# Patient Record
Sex: FEMALE | Race: WHITE | HISPANIC OR LATINO | Employment: FULL TIME | URBAN - METROPOLITAN AREA
[De-identification: names, ages, dates, MRNs, and addresses within clinical notes are randomized per-mention and may not be internally consistent; named-entity substitution may affect disease eponyms.]

---

## 2024-07-22 ENCOUNTER — APPOINTMENT (OUTPATIENT)
Dept: GENERAL RADIOLOGY | Facility: HOSPITAL | Age: 29
End: 2024-07-22
Payer: MEDICAID

## 2024-07-22 ENCOUNTER — HOSPITAL ENCOUNTER (OUTPATIENT)
Facility: HOSPITAL | Age: 29
Discharge: HOME | End: 2024-07-22
Attending: EMERGENCY MEDICINE | Admitting: EMERGENCY MEDICINE
Payer: MEDICAID

## 2024-07-22 VITALS
RESPIRATION RATE: 18 BRPM | OXYGEN SATURATION: 96 % | HEART RATE: 83 BPM | TEMPERATURE: 98.2 F | SYSTOLIC BLOOD PRESSURE: 130 MMHG | WEIGHT: 209 LBS | DIASTOLIC BLOOD PRESSURE: 100 MMHG | BODY MASS INDEX: 37.03 KG/M2 | HEIGHT: 63 IN

## 2024-07-22 DIAGNOSIS — J22 LOWER RESPIRATORY INFECTION (E.G., BRONCHITIS, PNEUMONIA, PNEUMONITIS, PULMONITIS): Primary | ICD-10-CM

## 2024-07-22 DIAGNOSIS — R06.2 WHEEZING: ICD-10-CM

## 2024-07-22 LAB
FLUAV SUBTYP SPEC NAA+PROBE: NOT DETECTED
FLUBV RNA ISLT QL NAA+PROBE: NOT DETECTED
SARS-COV-2 RNA RESP QL NAA+PROBE: NOT DETECTED

## 2024-07-22 PROCEDURE — 99203 OFFICE O/P NEW LOW 30 MIN: CPT

## 2024-07-22 PROCEDURE — G0463 HOSPITAL OUTPT CLINIC VISIT: HCPCS

## 2024-07-22 PROCEDURE — 87636 SARSCOV2 & INF A&B AMP PRB: CPT

## 2024-07-22 PROCEDURE — 71046 X-RAY EXAM CHEST 2 VIEWS: CPT

## 2024-07-22 RX ORDER — METHYLPREDNISOLONE 4 MG/1
TABLET ORAL
Qty: 21 TABLET | Refills: 0 | Status: SHIPPED | OUTPATIENT
Start: 2024-07-22

## 2024-07-22 RX ORDER — AZITHROMYCIN 250 MG/1
TABLET, FILM COATED ORAL
Qty: 6 TABLET | Refills: 0 | Status: SHIPPED | OUTPATIENT
Start: 2024-07-22

## 2024-07-22 NOTE — Clinical Note
[facility] [place] [address] [place] [address] [place] [address]  Phone: [phone]    Brooke Winston was seen and treated in our emergency department on 7/22/2024.  She may return to work on 07/23/2024.         Thank you for choosing [facility].    Abdoul Menjivar Jr., APRN

## 2024-07-22 NOTE — FSED PROVIDER NOTE
Subjective   History of Present Illness  29-year-old female reports Friday night having nasal congestion reports that all the congestion is now in her chest reports she has history of childhood asthma reports several children are sick at home.  Reports that her cough is mainly nonproductive but now she is wheezing.        Review of Systems   All other systems reviewed and are negative.      History reviewed. No pertinent past medical history.    No Known Allergies    History reviewed. No pertinent surgical history.    History reviewed. No pertinent family history.    Social History     Socioeconomic History    Marital status:            Objective   Physical Exam  Constitutional:       Appearance: Normal appearance.   HENT:      Head: Normocephalic and atraumatic.      Right Ear: Tympanic membrane and ear canal normal.      Left Ear: Tympanic membrane and ear canal normal.      Nose: Nose normal.   Eyes:      Extraocular Movements: Extraocular movements intact.      Pupils: Pupils are equal, round, and reactive to light.   Cardiovascular:      Pulses: Normal pulses.   Pulmonary:      Effort: Pulmonary effort is normal.      Breath sounds: Wheezing present.   Abdominal:      General: Abdomen is flat.      Palpations: Abdomen is soft.   Musculoskeletal:         General: Normal range of motion.   Skin:     General: Skin is warm.   Neurological:      General: No focal deficit present.      Mental Status: She is alert and oriented to person, place, and time.         Procedures           ED Course  ED Course as of 07/22/24 1806   Mon Jul 22, 2024   1704 COVID and flu are negative [WF]   1705 Chest x-ray  Impression:  No acute cardiopulmonary abnormality.   [WF]      ED Course User Index  [WF] Abdoul Menjivar Jr., APRN                                           Medical Decision Making  Patient lung sounds show evidence of wheezing coarse bronchial lung sounds will discharge home on a Z-Wily with Medrol dose  taper    Problems Addressed:  Lower respiratory infection (e.g., bronchitis, pneumonia, pneumonitis, pulmonitis): complicated acute illness or injury  Wheezing: complicated acute illness or injury    Amount and/or Complexity of Data Reviewed  Radiology: ordered.    Risk  Prescription drug management.        Final diagnoses:   Lower respiratory infection (e.g., bronchitis, pneumonia, pneumonitis, pulmonitis)   Wheezing       ED Disposition  ED Disposition       ED Disposition   Discharge    Condition   Stable    Comment   --               PATIENT CONNECTION - [facility] [address]  [phone]  Schedule an appointment as soon as possible for a visit   Or follow-up with your primary care provider         Medication List        New Prescriptions      azithromycin 250 MG tablet  Commonly known as: Zithromax Z-Wily  Take 2 tablets by mouth on day 1, then 1 tablet daily on days 2-5     methylPREDNISolone 4 MG dose pack  Commonly known as: MEDROL  Take as directed on package instructions.               Where to Get Your Medications        These medications were sent to [facility]/pharmacy #[id number] - [place], [place] - [address] [place] [address] - [phone]  - [phone] [address] [place] [address]      Hours: 24-hours Phone: [phone]   azithromycin 250 MG tablet  methylPREDNISolone 4 MG dose pack

## 2024-07-22 NOTE — DISCHARGE INSTRUCTIONS
Take Tylenol or ibuprofen for body ache fever management    Increase your fluid intake recommend bedside humidifier    Z-Wily for infectious bronchitis    Begin Medrol Dosepak for wheezing    Follow-up with family doctor as needed return to ER for worsening symptoms

## 2024-11-22 ENCOUNTER — HOSPITAL ENCOUNTER (EMERGENCY)
Facility: HOSPITAL | Age: 29
Discharge: HOME | End: 2024-11-23
Attending: EMERGENCY MEDICINE
Payer: MEDICAID

## 2024-11-22 DIAGNOSIS — H92.03 REFERRED OTALGIA, BILATERAL: ICD-10-CM

## 2024-11-22 DIAGNOSIS — J01.90 ACUTE SINUSITIS, RECURRENCE NOT SPECIFIED, UNSPECIFIED LOCATION: Primary | ICD-10-CM

## 2024-11-22 PROCEDURE — 99282 EMERGENCY DEPT VISIT SF MDM: CPT

## 2024-11-23 VITALS
SYSTOLIC BLOOD PRESSURE: 155 MMHG | WEIGHT: 209 LBS | RESPIRATION RATE: 20 BRPM | TEMPERATURE: 98 F | BODY MASS INDEX: 37.03 KG/M2 | DIASTOLIC BLOOD PRESSURE: 92 MMHG | OXYGEN SATURATION: 100 % | HEIGHT: 63 IN | HEART RATE: 77 BPM

## 2024-11-23 RX ORDER — PSEUDOEPHEDRINE HCL 120 MG/1
120 TABLET, FILM COATED, EXTENDED RELEASE ORAL 2 TIMES DAILY PRN
Qty: 20 TABLET | Refills: 0 | Status: SHIPPED | OUTPATIENT
Start: 2024-11-23

## 2024-11-23 RX ORDER — AZITHROMYCIN 250 MG/1
TABLET, FILM COATED ORAL
Qty: 6 TABLET | Refills: 0 | Status: SHIPPED | OUTPATIENT
Start: 2024-11-23

## 2024-11-23 NOTE — FSED PROVIDER NOTE
HPI: 29-year-old female arrives complaining of ear and sinus pain having just finished a round of amoxicillin for upper respiratory tract infection.  Now her right ear is bothering her but her left ear was bothering her last week.  She was just on a work retreat in [place] but usually works from home as an .  PMFSH: see problem list... Here with     ROS: As above.  All other systems are reviewed and negative.    PHYSICAL EXAM: BMI is 37    Head normocephalic atraumatic anicteric    ENT ears show a serous otitis on the right but not on the left there is no erythema or purulence    Nares clear there is some mild sinus congestion    Oropharynx clear and moist    Neck supple without cervical adenopathy    Lungs clear    Heart regular rate    MDM: Otalgia secondary to serous otitis with nasopharyngitis persistently.  She probably does not require antibiotics.  This process is likely to be viral and may benefit from anti-inflammatories and decongestants we will also give her a wait-and-see prescription for Z-Wily as requested.    ED COURSE: The patient is prescribed Sudafed    DIAGNOSIS: Bilateral otalgia, acute sinusitis.    DISPOSITION: Patient is discharged from the ED in good condition.

## 2024-11-23 NOTE — ED NOTES
Just finished antibiotic for upper respiratory infection and left ear infection, now right ear is bothering her and sore throat, nasal congestion worsening  
Patient diagnosed with upper respiratory infection and recently finished amoxicillin. Patient states her symptoms came back after a recent visit to [place]. Head and throat congestion she states it feels like there is fluid behind her ears  
Yes